# Patient Record
Sex: MALE | Race: WHITE | Employment: OTHER | URBAN - METROPOLITAN AREA
[De-identification: names, ages, dates, MRNs, and addresses within clinical notes are randomized per-mention and may not be internally consistent; named-entity substitution may affect disease eponyms.]

---

## 2017-08-24 ENCOUNTER — RECORDS - HEALTHEAST (OUTPATIENT)
Dept: LAB | Facility: CLINIC | Age: 62
End: 2017-08-24

## 2017-08-24 LAB
CHOLEST SERPL-MCNC: 165 MG/DL
FASTING STATUS PATIENT QL REPORTED: NORMAL
HDLC SERPL-MCNC: 40 MG/DL
LDLC SERPL CALC-MCNC: 108 MG/DL
TRIGL SERPL-MCNC: 85 MG/DL

## 2018-09-10 ENCOUNTER — RECORDS - HEALTHEAST (OUTPATIENT)
Dept: LAB | Facility: CLINIC | Age: 63
End: 2018-09-10

## 2018-09-10 LAB
ANION GAP SERPL CALCULATED.3IONS-SCNC: 8 MMOL/L (ref 5–18)
BUN SERPL-MCNC: 11 MG/DL (ref 8–22)
CALCIUM SERPL-MCNC: 9.1 MG/DL (ref 8.5–10.5)
CHLORIDE BLD-SCNC: 105 MMOL/L (ref 98–107)
CO2 SERPL-SCNC: 26 MMOL/L (ref 22–31)
CREAT SERPL-MCNC: 0.78 MG/DL (ref 0.7–1.3)
GFR SERPL CREATININE-BSD FRML MDRD: >60 ML/MIN/1.73M2
GLUCOSE BLD-MCNC: 105 MG/DL (ref 70–125)
POTASSIUM BLD-SCNC: 3.8 MMOL/L (ref 3.5–5)
SODIUM SERPL-SCNC: 139 MMOL/L (ref 136–145)

## 2019-07-23 ENCOUNTER — RECORDS - HEALTHEAST (OUTPATIENT)
Dept: LAB | Facility: CLINIC | Age: 64
End: 2019-07-23

## 2019-07-23 LAB
ANION GAP SERPL CALCULATED.3IONS-SCNC: 10 MMOL/L (ref 5–18)
BUN SERPL-MCNC: 11 MG/DL (ref 8–22)
CALCIUM SERPL-MCNC: 9.2 MG/DL (ref 8.5–10.5)
CHLORIDE BLD-SCNC: 104 MMOL/L (ref 98–107)
CHOLEST SERPL-MCNC: 161 MG/DL
CO2 SERPL-SCNC: 27 MMOL/L (ref 22–31)
CREAT SERPL-MCNC: 0.95 MG/DL (ref 0.7–1.3)
FASTING STATUS PATIENT QL REPORTED: ABNORMAL
GFR SERPL CREATININE-BSD FRML MDRD: >60 ML/MIN/1.73M2
GLUCOSE BLD-MCNC: 108 MG/DL (ref 70–125)
HDLC SERPL-MCNC: 37 MG/DL
LDLC SERPL CALC-MCNC: 99 MG/DL
POTASSIUM BLD-SCNC: 4 MMOL/L (ref 3.5–5)
SODIUM SERPL-SCNC: 141 MMOL/L (ref 136–145)
TRIGL SERPL-MCNC: 124 MG/DL

## 2020-07-08 ENCOUNTER — RECORDS - HEALTHEAST (OUTPATIENT)
Dept: LAB | Facility: CLINIC | Age: 65
End: 2020-07-08

## 2020-07-08 LAB
ANION GAP SERPL CALCULATED.3IONS-SCNC: 10 MMOL/L (ref 5–18)
BUN SERPL-MCNC: 10 MG/DL (ref 8–22)
CALCIUM SERPL-MCNC: 9.5 MG/DL (ref 8.5–10.5)
CHLORIDE BLD-SCNC: 103 MMOL/L (ref 98–107)
CHOLEST SERPL-MCNC: 151 MG/DL
CO2 SERPL-SCNC: 27 MMOL/L (ref 22–31)
CREAT SERPL-MCNC: 0.86 MG/DL (ref 0.7–1.3)
FASTING STATUS PATIENT QL REPORTED: NORMAL
GFR SERPL CREATININE-BSD FRML MDRD: >60 ML/MIN/1.73M2
GLUCOSE BLD-MCNC: 112 MG/DL (ref 70–125)
HDLC SERPL-MCNC: 44 MG/DL
LDLC SERPL CALC-MCNC: 96 MG/DL
POTASSIUM BLD-SCNC: 3.4 MMOL/L (ref 3.5–5)
PSA SERPL-MCNC: 1.6 NG/ML (ref 0–4.5)
SODIUM SERPL-SCNC: 140 MMOL/L (ref 136–145)
TRIGL SERPL-MCNC: 54 MG/DL

## 2020-08-11 ENCOUNTER — TRANSFERRED RECORDS (OUTPATIENT)
Dept: HEALTH INFORMATION MANAGEMENT | Facility: CLINIC | Age: 65
End: 2020-08-11

## 2020-08-11 ENCOUNTER — RECORDS - HEALTHEAST (OUTPATIENT)
Dept: LAB | Facility: CLINIC | Age: 65
End: 2020-08-11

## 2020-08-11 LAB
ANION GAP SERPL CALCULATED.3IONS-SCNC: 10 MMOL/L (ref 5–18)
BUN SERPL-MCNC: 16 MG/DL (ref 8–22)
CALCIUM SERPL-MCNC: 9.2 MG/DL (ref 8.5–10.5)
CHLORIDE BLD-SCNC: 105 MMOL/L (ref 98–107)
CO2 SERPL-SCNC: 25 MMOL/L (ref 22–31)
CREAT SERPL-MCNC: 0.81 MG/DL (ref 0.7–1.3)
GFR SERPL CREATININE-BSD FRML MDRD: >60 ML/MIN/1.73M2
GLUCOSE BLD-MCNC: 94 MG/DL (ref 70–125)
POTASSIUM BLD-SCNC: 3.7 MMOL/L (ref 3.5–5)
SODIUM SERPL-SCNC: 140 MMOL/L (ref 136–145)

## 2020-08-14 ENCOUNTER — MEDICAL CORRESPONDENCE (OUTPATIENT)
Dept: HEALTH INFORMATION MANAGEMENT | Facility: CLINIC | Age: 65
End: 2020-08-14

## 2020-09-09 NOTE — PROGRESS NOTES
CARDIOLOGY CONSULT    REASON FOR CONSULT: AFIB    PRIMARY CARE PHYSICIAN:  Soraya Lima    HISTORY OF PRESENT ILLNESS:  65-year-old male seen for atrial fibrillation and cardiomyopathy.  He has hypertension, venous insufficiency, sleep apnea with no CPAP use, BPH.    A. fib was diagnosed around 2006, he did not report any symptoms at that time.  He has since been on Toprol and warfarin.  Echocardiogram through [place] [facility] showed EF 40 to 45%, mild LVH, severe biatrial enlargement, 1+ AI.  Nuclear stress in 2007 showed normal perfusion, EF 43%.    He has not seen a cardiologist or had any cardiac testing recently.  At baseline he does some light activity.  Prior to the pandemic, he was doing water aerobics with no chest pain or shortness of breath.  More recently he has been quite sedentary.  Blood pressures not checked at home.  He has chronic lower extremity edema with significant varicose veins, he has had venous ablation in the past.    He has a left knee meniscus tear and is hoping to have laparoscopic surgery at some point.    He plans to go to [place] early October.    PAST MEDICAL HISTORY:  1.  Atrial fibrillation  2.  Hypertension  3.  Venous insufficiency  4.  Sleep apnea  5.  BPH    MEDICATIONS:  Current Outpatient Medications   Medication     cephALEXin (KEFLEX) 500 MG capsule     lisinopril (ZESTRIL) 5 MG tablet     metoprolol tartrate (LOPRESSOR) 25 MG tablet     sildenafil (VIAGRA) 100 MG tablet     terazosin (HYTRIN) 10 MG capsule     triamcinolone (KENALOG) 0.1 % external cream     warfarin ANTICOAGULANT (COUMADIN) 7.5 MG tablet     No current facility-administered medications for this visit.          ALLERGIES:  Allergies not on file    SOCIAL HISTORY:  Non-smoker, occasional alcohol use    FAMILY HISTORY: No premature coronary artery disease      REVIEW OF SYSTEMS:  Constitutional:  No weight loss, fever, chills, weakness or fatigue.  HEENT:  Eyes:  No visual loss, blurred vision,  "double vision or yellow sclerae. No hearing loss, sneezing, congestion, runny nose or sore throat.  Skin:  No rash or itching.  Cardiovascular: per HPI  Respiratory: per HPI  GI:  No anorexia, nausea, vomiting or diarrhea. No abdominal pain or blood.  :  No dysurea, hematuria  Neurologic:  No headache, dizziness, syncope, paralysis, ataxia, numbness or tingling in the extremities. No change in bowel or bladder control.  Musculoskeletal:  No muscle, back pain, joint pain or stiffness.  Hematologic:  No anemia, bleeding or bruising.  Lymphatics:  No enlarged nodes. No history of splenectomy.  Psychiatric:  No history of depression or anxiety.  Endocrine:  No reports of sweating, cold or heat intolerance. No polyuria or polydipsia.  Allergies:  No history of asthma, hives, eczema or rhinitis.    PHYSICAL EXAM:  /78 (Cuff Size: Adult Regular)   Pulse 77   Ht 1.778 m (5' 10\")   Wt 133.3 kg (293 lb 12.8 oz)   SpO2 99%   BMI 42.16 kg/m      Constitutional: awake, alert, no distress  Eyes: PERRL, sclera nonicteric  ENT: trachea midline  Respiratory: Lungs clear  Cardiovascular: Regular rate, totally irregular rhythm, no murmurs, 1-2+ nonpitting edema with significant bilateral varicose veins  GI: nondistended, nontender, bowel sounds present  Lymph/Hematologic: no lymphadenopathy  Skin: dry, no rash  Musculoskeletal: good muscle tone, strength 5/5 in upper and lower extremities  Neurologic: no focal deficits  Neuropsychiatric: appropriate affact    DATA:  Labs:   July 2020: Potassium 3.7, creatinine 0.8    EKG: September 10, 2020: Atrial fibrillation, rate 74    ASSESSMENT:  64-year-old male seen for atrial fibrillation and cardiomyopathy.  He has no new cardiac symptoms.  He likely has had chronic A. fib for many years.  Ejection fraction back in 2006 was 40%.  He has not had any recent cardiac studies.  He has no overt heart failure symptoms or angina.    Echocardiogram will be done.  Coronary calcium score " will also be done, he is asking about assessing for plaque burden in his arteries.  He is already on metoprolol and lisinopril.  He is encouraged to check his blood pressure more at home.    RECOMMENDATIONS:  1.  Chronic atrial fibrillation  -Continue metoprolol and warfarin    2.  History of cardiomyopathy  -Echocardiogram  -Coronary calcium score, low threshold for stress testing if very elevated  -Continue lisinopril and metoprolol, consider uptitrating doses    3.  Hypertension  -Encouraged home blood pressure checks, goal blood pressure 130/80 or less    4.  Preop cardiovascular evaluation  -Patient has no concerning symptoms, but will give further recommendations once echocardiogram is done.    Follow-up in 1 year.  Sooner if any significant test abnormalities.    Toño Michelle MD  Cardiology - [facility]  Pager:  [phone]  Text Page  September 10, 2020    Addendum:   Echocardiogram shows EF 45%, 1-2+ MR, aorta 4.6 cm.  Ejection fraction is unchanged from previous echoes.    Patient is low risk for any cardiac complications for his upcoming knee surgery.  No additional cardiac testing is required prior to surgery.    Toño Michelle MD  Cardiology - [facility]  Pager: [phone]  Text Page  September 30, 2020

## 2020-09-10 ENCOUNTER — OFFICE VISIT (OUTPATIENT)
Dept: CARDIOLOGY | Facility: CLINIC | Age: 65
End: 2020-09-10
Payer: COMMERCIAL

## 2020-09-10 VITALS
HEIGHT: 70 IN | BODY MASS INDEX: 42.06 KG/M2 | OXYGEN SATURATION: 99 % | SYSTOLIC BLOOD PRESSURE: 128 MMHG | WEIGHT: 293.8 LBS | HEART RATE: 77 BPM | DIASTOLIC BLOOD PRESSURE: 78 MMHG

## 2020-09-10 DIAGNOSIS — I42.9 CARDIOMYOPATHY, UNSPECIFIED TYPE (H): ICD-10-CM

## 2020-09-10 DIAGNOSIS — Z13.6 SCREENING FOR CARDIOVASCULAR CONDITION: Primary | ICD-10-CM

## 2020-09-10 DIAGNOSIS — I48.21 PERMANENT ATRIAL FIBRILLATION (H): ICD-10-CM

## 2020-09-10 PROCEDURE — 99204 OFFICE O/P NEW MOD 45 MIN: CPT | Performed by: INTERNAL MEDICINE

## 2020-09-10 PROCEDURE — 93000 ELECTROCARDIOGRAM COMPLETE: CPT | Performed by: INTERNAL MEDICINE

## 2020-09-10 RX ORDER — SILDENAFIL 100 MG/1
TABLET, FILM COATED ORAL
COMMUNITY
Start: 2020-07-08

## 2020-09-10 RX ORDER — WARFARIN SODIUM 7.5 MG/1
TABLET ORAL
COMMUNITY
Start: 2020-03-30

## 2020-09-10 RX ORDER — LISINOPRIL 5 MG/1
TABLET ORAL
COMMUNITY
Start: 2020-07-02 | End: 2021-07-14 | Stop reason: DRUGHIGH

## 2020-09-10 RX ORDER — TERAZOSIN 10 MG/1
CAPSULE ORAL
COMMUNITY
Start: 2020-07-02

## 2020-09-10 RX ORDER — CEPHALEXIN 500 MG/1
CAPSULE ORAL
COMMUNITY
Start: 2020-07-16

## 2020-09-10 RX ORDER — METOPROLOL TARTRATE 25 MG/1
TABLET, FILM COATED ORAL
COMMUNITY
Start: 2020-07-17

## 2020-09-10 RX ORDER — TRIAMCINOLONE ACETONIDE 1 MG/G
CREAM TOPICAL
COMMUNITY
Start: 2020-07-17

## 2020-09-10 SDOH — HEALTH STABILITY: MENTAL HEALTH: HOW OFTEN DO YOU HAVE A DRINK CONTAINING ALCOHOL?: NEVER

## 2020-09-10 ASSESSMENT — MIFFLIN-ST. JEOR: SCORE: 2123.92

## 2020-09-10 NOTE — LETTER
9/10/2020    Soraya Lima  [facility] [address]  [facility] [address]    RE: Kings Priest       Dear Colleague,    I had the pleasure of seeing Kings Priest in the [facility].    CARDIOLOGY CONSULT    REASON FOR CONSULT: AFIB    PRIMARY CARE PHYSICIAN:  Soraya Lima    HISTORY OF PRESENT ILLNESS:  65-year-old male seen for atrial fibrillation and cardiomyopathy.  He has hypertension, venous insufficiency, sleep apnea with no CPAP use, BPH.    A. fib was diagnosed around 2006, he did not report any symptoms at that time.  He has since been on Toprol and warfarin.  Echocardiogram through [place] [facility] showed EF 40 to 45%, mild LVH, severe biatrial enlargement, 1+ AI.  Nuclear stress in 2007 showed normal perfusion, EF 43%.    He has not seen a cardiologist or had any cardiac testing recently.  At baseline he does some light activity.  Prior to the pandemic, he was doing water aerobics with no chest pain or shortness of breath.  More recently he has been quite sedentary.  Blood pressures not checked at home.  He has chronic lower extremity edema with significant varicose veins, he has had venous ablation in the past.    He has a left knee meniscus tear and is hoping to have laparoscopic surgery at some point.    He plans to go to [place] early October.    PAST MEDICAL HISTORY:  1.  Atrial fibrillation  2.  Hypertension  3.  Venous insufficiency  4.  Sleep apnea  5.  BPH    MEDICATIONS:  Current Outpatient Medications   Medication     cephALEXin (KEFLEX) 500 MG capsule     lisinopril (ZESTRIL) 5 MG tablet     metoprolol tartrate (LOPRESSOR) 25 MG tablet     sildenafil (VIAGRA) 100 MG tablet     terazosin (HYTRIN) 10 MG capsule     triamcinolone (KENALOG) 0.1 % external cream     warfarin ANTICOAGULANT (COUMADIN) 7.5 MG tablet     No current facility-administered medications for this visit.          ALLERGIES:  Allergies not on file    SOCIAL  "HISTORY:  Non-smoker, occasional alcohol use    FAMILY HISTORY: No premature coronary artery disease      REVIEW OF SYSTEMS:  Constitutional:  No weight loss, fever, chills, weakness or fatigue.  HEENT:  Eyes:  No visual loss, blurred vision, double vision or yellow sclerae. No hearing loss, sneezing, congestion, runny nose or sore throat.  Skin:  No rash or itching.  Cardiovascular: per HPI  Respiratory: per HPI  GI:  No anorexia, nausea, vomiting or diarrhea. No abdominal pain or blood.  :  No dysurea, hematuria  Neurologic:  No headache, dizziness, syncope, paralysis, ataxia, numbness or tingling in the extremities. No change in bowel or bladder control.  Musculoskeletal:  No muscle, back pain, joint pain or stiffness.  Hematologic:  No anemia, bleeding or bruising.  Lymphatics:  No enlarged nodes. No history of splenectomy.  Psychiatric:  No history of depression or anxiety.  Endocrine:  No reports of sweating, cold or heat intolerance. No polyuria or polydipsia.  Allergies:  No history of asthma, hives, eczema or rhinitis.    PHYSICAL EXAM:  /78 (Cuff Size: Adult Regular)   Pulse 77   Ht 1.778 m (5' 10\")   Wt 133.3 kg (293 lb 12.8 oz)   SpO2 99%   BMI 42.16 kg/m      Constitutional: awake, alert, no distress  Eyes: PERRL, sclera nonicteric  ENT: trachea midline  Respiratory: Lungs clear  Cardiovascular: Regular rate, totally irregular rhythm, no murmurs, 1-2+ nonpitting edema with significant bilateral varicose veins  GI: nondistended, nontender, bowel sounds present  Lymph/Hematologic: no lymphadenopathy  Skin: dry, no rash  Musculoskeletal: good muscle tone, strength 5/5 in upper and lower extremities  Neurologic: no focal deficits  Neuropsychiatric: appropriate affact    DATA:  Labs:   July 2020: Potassium 3.7, creatinine 0.8    EKG: September 10, 2020: Atrial fibrillation, rate 74    ASSESSMENT:  64-year-old male seen for atrial fibrillation and cardiomyopathy.  He has no new cardiac symptoms.  " He likely has had chronic A. fib for many years.  Ejection fraction back in 2006 was 40%.  He has not had any recent cardiac studies.  He has no overt heart failure symptoms or angina.    Echocardiogram will be done.  Coronary calcium score will also be done, he is asking about assessing for plaque burden in his arteries.  He is already on metoprolol and lisinopril.  He is encouraged to check his blood pressure more at home.    RECOMMENDATIONS:  1.  Chronic atrial fibrillation  -Continue metoprolol and warfarin    2.  History of cardiomyopathy  -Echocardiogram  -Coronary calcium score, low threshold for stress testing if very elevated  -Continue lisinopril and metoprolol, consider uptitrating doses    3.  Hypertension  -Encouraged home blood pressure checks, goal blood pressure 130/80 or less    4.  Preop cardiovascular evaluation  -Patient has no concerning symptoms, but will give further recommendations once echocardiogram is done.    Follow-up in 1 year.  Sooner if any significant test abnormalities.    Toño Michelle MD  Cardiology - [facility]  Pager:  [phone]  Text Page  September 10, 2020        Thank you for allowing me to participate in the care of your patient.    Sincerely,     Toño Michelle MD     [facility]

## 2020-09-10 NOTE — PATIENT INSTRUCTIONS
"Check blood pressure at home once weekly.  Goal BP is 130/80 or less.    Echocardiogram  Will schedule an echocardiogram, or ultrasound of the heart.  This looks at the size and function of the heart and valves.  It generally takes about 20-30 minutes.  This will tell if there is any reduced heart function or problem with any of the valves.    Getting Ready for a CT Coronary Calcium Scan  What is this test?  A calcium scoring CT (computed tomography) scan is a painless, highly sensitive test that shows the amount of calcium build-up in your heart arteries. This tells us your future risk for heart attack.  Any plaque that has started to form in the heart arteries will show up on the CT.  A \"calcium score\" is then calculated and is compared to others of your age and gender.  This test does not show the percent blockage in any given artery, but rather a \"global burden\" of plaque.  If the calcium score is very high, then other testing such as stress testing is sometimes recommended.  Will my insurance cover it?  Please check with your insurance plan. This is a screening test, which may or may not be covered by insurance.   How do I get ready for my exam?  It is best to avoid caffeine on the day of your test.   The entire exam will take about 30 minutes or less.   Be sure to tell your doctor:    If there s any chance you are pregnant.     If you have any special needs.  What happens during the exam?  Please wear loose clothing, such as a sweat suit or jogging clothes. Avoid snaps, zippers and other metal. We may ask you to undress and put on a hospital gown.     You will lie on a table that will move through the scanner. The scanner is a short tube.    You will not be enclosed. The scan takes only a few minutes.    You must lie very still during the scans and follow breathing instructions.  Is it safe?  As with any scan that uses radiation, there is a very small increased risk of cancer. Your doctor orders a scan when he " or she feels the potential benefits outweigh the risks of the scan. Please talk with your doctor if you have any concerns before your exam.  When will I know the results?  You should discuss your test with your family doctor. He or she can go over the results with you. If needed, he or she can also suggest treatment or lifestyle changes.

## 2020-09-10 NOTE — LETTER
9/10/2020    Soraya Lima  [facility] [address]  [facility] [address]    RE: Kings Priest       Dear Colleague,    I had the pleasure of seeing Kings Priest in the [facility].    CARDIOLOGY CONSULT    REASON FOR CONSULT: AFIB    PRIMARY CARE PHYSICIAN:  Soraya Lima    HISTORY OF PRESENT ILLNESS:  65-year-old male seen for atrial fibrillation and cardiomyopathy.  He has hypertension, venous insufficiency, sleep apnea with no CPAP use, BPH.    A. fib was diagnosed around 2006, he did not report any symptoms at that time.  He has since been on Toprol and warfarin.  Echocardiogram through [place] [facility] showed EF 40 to 45%, mild LVH, severe biatrial enlargement, 1+ AI.  Nuclear stress in 2007 showed normal perfusion, EF 43%.    He has not seen a cardiologist or had any cardiac testing recently.  At baseline he does some light activity.  Prior to the pandemic, he was doing water aerobics with no chest pain or shortness of breath.  More recently he has been quite sedentary.  Blood pressures not checked at home.  He has chronic lower extremity edema with significant varicose veins, he has had venous ablation in the past.    He has a left knee meniscus tear and is hoping to have laparoscopic surgery at some point.    He plans to go to [place] early October.    PAST MEDICAL HISTORY:  1.  Atrial fibrillation  2.  Hypertension  3.  Venous insufficiency  4.  Sleep apnea  5.  BPH    MEDICATIONS:  Current Outpatient Medications   Medication     cephALEXin (KEFLEX) 500 MG capsule     lisinopril (ZESTRIL) 5 MG tablet     metoprolol tartrate (LOPRESSOR) 25 MG tablet     sildenafil (VIAGRA) 100 MG tablet     terazosin (HYTRIN) 10 MG capsule     triamcinolone (KENALOG) 0.1 % external cream     warfarin ANTICOAGULANT (COUMADIN) 7.5 MG tablet     No current facility-administered medications for this visit.          ALLERGIES:  Allergies not on file    SOCIAL  "HISTORY:  Non-smoker, occasional alcohol use    FAMILY HISTORY: No premature coronary artery disease      REVIEW OF SYSTEMS:  Constitutional:  No weight loss, fever, chills, weakness or fatigue.  HEENT:  Eyes:  No visual loss, blurred vision, double vision or yellow sclerae. No hearing loss, sneezing, congestion, runny nose or sore throat.  Skin:  No rash or itching.  Cardiovascular: per HPI  Respiratory: per HPI  GI:  No anorexia, nausea, vomiting or diarrhea. No abdominal pain or blood.  :  No dysurea, hematuria  Neurologic:  No headache, dizziness, syncope, paralysis, ataxia, numbness or tingling in the extremities. No change in bowel or bladder control.  Musculoskeletal:  No muscle, back pain, joint pain or stiffness.  Hematologic:  No anemia, bleeding or bruising.  Lymphatics:  No enlarged nodes. No history of splenectomy.  Psychiatric:  No history of depression or anxiety.  Endocrine:  No reports of sweating, cold or heat intolerance. No polyuria or polydipsia.  Allergies:  No history of asthma, hives, eczema or rhinitis.    PHYSICAL EXAM:  /78 (Cuff Size: Adult Regular)   Pulse 77   Ht 1.778 m (5' 10\")   Wt 133.3 kg (293 lb 12.8 oz)   SpO2 99%   BMI 42.16 kg/m      Constitutional: awake, alert, no distress  Eyes: PERRL, sclera nonicteric  ENT: trachea midline  Respiratory: Lungs clear  Cardiovascular: Regular rate, totally irregular rhythm, no murmurs, 1-2+ nonpitting edema with significant bilateral varicose veins  GI: nondistended, nontender, bowel sounds present  Lymph/Hematologic: no lymphadenopathy  Skin: dry, no rash  Musculoskeletal: good muscle tone, strength 5/5 in upper and lower extremities  Neurologic: no focal deficits  Neuropsychiatric: appropriate affact    DATA:  Labs:   July 2020: Potassium 3.7, creatinine 0.8    EKG: September 10, 2020: Atrial fibrillation, rate 74    ASSESSMENT:  64-year-old male seen for atrial fibrillation and cardiomyopathy.  He has no new cardiac symptoms.  " He likely has had chronic A. fib for many years.  Ejection fraction back in 2006 was 40%.  He has not had any recent cardiac studies.  He has no overt heart failure symptoms or angina.    Echocardiogram will be done.  Coronary calcium score will also be done, he is asking about assessing for plaque burden in his arteries.  He is already on metoprolol and lisinopril.  He is encouraged to check his blood pressure more at home.    RECOMMENDATIONS:  1.  Chronic atrial fibrillation  -Continue metoprolol and warfarin    2.  History of cardiomyopathy  -Echocardiogram  -Coronary calcium score, low threshold for stress testing if very elevated  -Continue lisinopril and metoprolol, consider uptitrating doses    3.  Hypertension  -Encouraged home blood pressure checks, goal blood pressure 130/80 or less    4.  Preop cardiovascular evaluation  -Patient has no concerning symptoms, but will give further recommendations once echocardiogram is done.    Follow-up in 1 year.  Sooner if any significant test abnormalities.    Toño Michelle MD  Cardiology - [facility]  Pager:  [phone]  Text Page  September 10, 2020        Thank you for allowing me to participate in the care of your patient.      Sincerely,     Toño Michelle MD     [facility]    cc:   No referring provider defined for this encounter.

## 2020-09-29 ENCOUNTER — HOSPITAL ENCOUNTER (OUTPATIENT)
Dept: CARDIOLOGY | Facility: CLINIC | Age: 65
End: 2020-09-29
Attending: INTERNAL MEDICINE
Payer: COMMERCIAL

## 2020-09-29 DIAGNOSIS — I48.21 PERMANENT ATRIAL FIBRILLATION (H): ICD-10-CM

## 2020-09-29 PROCEDURE — 93306 TTE W/DOPPLER COMPLETE: CPT | Mod: 26 | Performed by: INTERNAL MEDICINE

## 2020-09-29 PROCEDURE — 93306 TTE W/DOPPLER COMPLETE: CPT

## 2020-09-30 ENCOUNTER — DOCUMENTATION ONLY (OUTPATIENT)
Dept: CARDIOLOGY | Facility: CLINIC | Age: 65
End: 2020-09-30

## 2020-09-30 NOTE — PROGRESS NOTES
Faxed Dr. Michelle' last OV note (addended) to [facility] (fax [phone]).   Sophia RN, BSN  09/30/20 2:09 PM

## 2020-10-01 ENCOUNTER — TELEPHONE (OUTPATIENT)
Dept: CARDIOLOGY | Facility: CLINIC | Age: 65
End: 2020-10-01

## 2020-10-01 NOTE — TELEPHONE ENCOUNTER
----- Message from Toño Michelle MD sent at 9/30/2020  9:46 AM CDT -----  Echo is unchanged from previous.  He is low risk for knee surgery, I addended my note.      Neeraj      Called pt, advised echo results. Mailed copy of Echo report to him per pt request.   Already faxed Dr. Michelle' last OV note to [facility]. Pt will still have CT Calcium screening 10/5/20.   Sophia RN, BSN  10/01/20 1:06 PM

## 2020-10-05 ENCOUNTER — HOSPITAL ENCOUNTER (OUTPATIENT)
Dept: CARDIOLOGY | Facility: CLINIC | Age: 65
Discharge: HOME | End: 2020-10-05
Attending: INTERNAL MEDICINE | Admitting: INTERNAL MEDICINE
Payer: COMMERCIAL

## 2020-10-05 ENCOUNTER — TELEPHONE (OUTPATIENT)
Dept: CARDIOLOGY | Facility: CLINIC | Age: 65
End: 2020-10-05

## 2020-10-05 DIAGNOSIS — R93.1 ELEVATED CORONARY ARTERY CALCIUM SCORE: ICD-10-CM

## 2020-10-05 DIAGNOSIS — I42.9 CARDIOMYOPATHY (H): ICD-10-CM

## 2020-10-05 DIAGNOSIS — Z13.6 SCREENING FOR CARDIOVASCULAR CONDITION: Primary | ICD-10-CM

## 2020-10-05 DIAGNOSIS — I48.21 PERMANENT ATRIAL FIBRILLATION (H): ICD-10-CM

## 2020-10-05 DIAGNOSIS — R91.8 PULMONARY NODULES: ICD-10-CM

## 2020-10-05 PROCEDURE — 75571 CT HRT W/O DYE W/CA TEST: CPT | Mod: 26 | Performed by: INTERNAL MEDICINE

## 2020-10-05 PROCEDURE — 75571 CT HRT W/O DYE W/CA TEST: CPT | Mod: GA

## 2020-10-05 NOTE — TELEPHONE ENCOUNTER
Last visit 9/10/20 w/ Dr. Michelle for Chronic AFib, CM and preop evaluation. No new sxs. EF in 2006 was 40%. On lisinopril, metoprolol and warfarin.  Ordered Echo and Coronary calcium score - noted low threshold for stress testing if very elevated. Preop recommendations based on test results. Rec follow-up x1 yr.   Echo done 9/29/20. Low risk for knee surgery per Dr. Michelle    ----- Message from Toño Michelle MD sent at 10/5/2020 12:32 PM CDT -----  Calcium score is moderately elevated, a little more than average for his age.  I would like him to have a Lexiscan nuclear stress.  Then follow-up with MABEL.  Also can you put an order in for noncontrast chest CT in 6 months to follow-up on lung nodules.  Thanks,  Neeraj      Called pt, reviewed results. Pt understood. He is currently in [place], [place] for the next 6 weeks. Wants to schedule it in mid-late November. I advised if he experiences any new symptoms to see a provider in [place], can give me a call. We cannot do visits with the patient while he is in [place].   Will send copy of CT results to his PCP at [facility].   Pt requested we mail him a copy of his results (address for the next 6 weeks: [address], [address], [place] [address]).  I sent him a message in [employer] as well. Will have scheduling call him.   Sophia RAYMOND, BSN  10/05/20 2:51 PM

## 2020-11-16 ENCOUNTER — TELEPHONE (OUTPATIENT)
Dept: CARDIOLOGY | Facility: CLINIC | Age: 65
End: 2020-11-16

## 2020-11-16 NOTE — TELEPHONE ENCOUNTER
Pt called, is scheduled for lexiscan 11/25/20 however he is still in [place]. Is worried about coming back to [place] when the covid cases are so high. Wonders how urgent his stress test is. Reviewed telephone encounter 10/5/20. Advised he could certainly establish a cardiologist in [place] and they can order a stress test there. He will think about it and get back to me.   Sophia RN, BSN  11/16/20 11:52 AM

## 2020-12-21 ENCOUNTER — HOSPITAL ENCOUNTER (OUTPATIENT)
Dept: NUCLEAR MEDICINE | Facility: CLINIC | Age: 65
Setting detail: NUCLEAR MEDICINE
Discharge: HOME | End: 2020-12-21
Attending: INTERNAL MEDICINE | Admitting: INTERNAL MEDICINE
Payer: COMMERCIAL

## 2020-12-21 ENCOUNTER — HOSPITAL ENCOUNTER (OUTPATIENT)
Dept: CARDIOLOGY | Facility: CLINIC | Age: 65
End: 2020-12-21
Attending: INTERNAL MEDICINE
Payer: COMMERCIAL

## 2020-12-21 DIAGNOSIS — R93.1 ELEVATED CORONARY ARTERY CALCIUM SCORE: ICD-10-CM

## 2020-12-21 DIAGNOSIS — I42.9 CARDIOMYOPATHY (H): ICD-10-CM

## 2020-12-21 DIAGNOSIS — Z13.6 SCREENING FOR CARDIOVASCULAR CONDITION: ICD-10-CM

## 2020-12-21 DIAGNOSIS — I48.21 PERMANENT ATRIAL FIBRILLATION (H): ICD-10-CM

## 2020-12-21 PROCEDURE — 250N000011 HC RX IP 250 OP 636: Performed by: INTERNAL MEDICINE

## 2020-12-21 PROCEDURE — 78452 HT MUSCLE IMAGE SPECT MULT: CPT | Mod: 26 | Performed by: INTERNAL MEDICINE

## 2020-12-21 PROCEDURE — A9502 TC99M TETROFOSMIN: HCPCS | Performed by: INTERNAL MEDICINE

## 2020-12-21 PROCEDURE — 78452 HT MUSCLE IMAGE SPECT MULT: CPT

## 2020-12-21 PROCEDURE — 93016 CV STRESS TEST SUPVJ ONLY: CPT | Performed by: INTERNAL MEDICINE

## 2020-12-21 PROCEDURE — 343N000001 HC RX 343: Performed by: INTERNAL MEDICINE

## 2020-12-21 PROCEDURE — 93018 CV STRESS TEST I&R ONLY: CPT | Performed by: INTERNAL MEDICINE

## 2020-12-21 PROCEDURE — 93017 CV STRESS TEST TRACING ONLY: CPT

## 2020-12-21 RX ORDER — REGADENOSON 0.08 MG/ML
0.4 INJECTION, SOLUTION INTRAVENOUS ONCE
Status: COMPLETED | OUTPATIENT
Start: 2020-12-21 | End: 2020-12-21

## 2020-12-21 RX ORDER — ALBUTEROL SULFATE 90 UG/1
2 AEROSOL, METERED RESPIRATORY (INHALATION) EVERY 5 MIN PRN
Status: DISCONTINUED | OUTPATIENT
Start: 2020-12-21 | End: 2020-12-22 | Stop reason: HOSPADM

## 2020-12-21 RX ORDER — AMINOPHYLLINE 25 MG/ML
50-100 INJECTION, SOLUTION INTRAVENOUS
Status: DISCONTINUED | OUTPATIENT
Start: 2020-12-21 | End: 2020-12-22 | Stop reason: HOSPADM

## 2020-12-21 RX ORDER — CAFFEINE CITRATE 20 MG/ML
60 SOLUTION INTRAVENOUS
Status: DISCONTINUED | OUTPATIENT
Start: 2020-12-21 | End: 2020-12-22 | Stop reason: HOSPADM

## 2020-12-21 RX ORDER — REGADENOSON 0.08 MG/ML
INJECTION, SOLUTION INTRAVENOUS
Status: DISCONTINUED
Start: 2020-12-21 | End: 2020-12-21 | Stop reason: HOSPADM

## 2020-12-21 RX ORDER — ACYCLOVIR 200 MG/1
0-1 CAPSULE ORAL
Status: DISCONTINUED | OUTPATIENT
Start: 2020-12-21 | End: 2020-12-22 | Stop reason: HOSPADM

## 2020-12-21 RX ADMIN — REGADENOSON 0.4 MG: 0.08 INJECTION, SOLUTION INTRAVENOUS at 08:00

## 2020-12-21 RX ADMIN — Medication 34 MILLICURIE: at 08:06

## 2020-12-22 ENCOUNTER — HOSPITAL ENCOUNTER (OUTPATIENT)
Dept: NUCLEAR MEDICINE | Facility: CLINIC | Age: 65
Setting detail: NUCLEAR MEDICINE
End: 2020-12-22
Attending: INTERNAL MEDICINE
Payer: COMMERCIAL

## 2020-12-22 LAB
CV STRESS MAX HR HE: 75
NUC STRESS EJECTION FRACTION: 57 %
RATE PRESSURE PRODUCT: NORMAL
STRESS ECHO BASELINE DIASTOLIC HE: 88
STRESS ECHO BASELINE HR: 76
STRESS ECHO BASELINE SYSTOLIC BP: 139
STRESS ECHO CALCULATED PERCENT HR: 49 %
STRESS ECHO LAST STRESS DIASTOLIC BP: 89
STRESS ECHO LAST STRESS SYSTOLIC BP: 138
STRESS ECHO TARGET HR: 154

## 2020-12-22 PROCEDURE — A9502 TC99M TETROFOSMIN: HCPCS | Performed by: INTERNAL MEDICINE

## 2020-12-22 PROCEDURE — 343N000001 HC RX 343: Performed by: INTERNAL MEDICINE

## 2020-12-22 RX ADMIN — Medication 34 MILLICURIE: at 11:53

## 2020-12-23 ENCOUNTER — TELEPHONE (OUTPATIENT)
Dept: CARDIOLOGY | Facility: CLINIC | Age: 65
End: 2020-12-23

## 2020-12-23 NOTE — TELEPHONE ENCOUNTER
Last visit 9/10/20 w/ Dr. Michelle for Chronic AFib, CM and preop evaluation. No new sxs. EF in 2006 was 40%. Ordered Echo and Coronary calcium score.  Echo done 9/29/20. Low risk for knee surgery per Dr. Michelle.   Calcium score 10/5/20, moderately elevated, a little more than average for his age. Dr. Michelle recommended Lexiscan nuclear stress test then follow-up with MABEL. Also ordered noncontrast CT in 6 months to follow-up on lung nodules.   Pt didn't have any testing done right away because he was in [place]. Does not have a follow-up MABEL visit scheduled yet. There are limited options for MABEL follow-up before pt leaves for [place] 12/29/20 in the afternoon.     Pt had Lexiscan 12/22/20, reviewed by Dr. Michelle    ----- Message from Toño Michelle MD sent at 12/23/2020 10:39 AM CST -----  Stress is equivocal.  If he is not having any chest pain or similar symptoms, then f/u when he returns from [place].  If any symptoms, then f/u sooner with MABEL.  Neeraj    Called pt, reviewed results/recommendations. Pt thinks he will be back in [place] in June 2021. Updated orders.  Sophia RN, BSN  12/23/20 2:41 PM

## 2021-01-04 ENCOUNTER — HEALTH MAINTENANCE LETTER (OUTPATIENT)
Age: 66
End: 2021-01-04

## 2021-05-30 ENCOUNTER — RECORDS - HEALTHEAST (OUTPATIENT)
Dept: ADMINISTRATIVE | Facility: CLINIC | Age: 66
End: 2021-05-30

## 2021-06-02 ENCOUNTER — RECORDS - HEALTHEAST (OUTPATIENT)
Dept: ADMINISTRATIVE | Facility: CLINIC | Age: 66
End: 2021-06-02

## 2021-06-28 ENCOUNTER — HOSPITAL ENCOUNTER (OUTPATIENT)
Dept: CT IMAGING | Facility: CLINIC | Age: 66
Discharge: HOME | End: 2021-06-28
Attending: INTERNAL MEDICINE | Admitting: INTERNAL MEDICINE
Payer: COMMERCIAL

## 2021-06-28 DIAGNOSIS — R91.8 PULMONARY NODULES: ICD-10-CM

## 2021-06-28 PROCEDURE — 71250 CT THORAX DX C-: CPT

## 2021-07-10 NOTE — PROGRESS NOTES
CARDIOLOGY VISIT    REASON FOR VISIT: MARQUES fib, cardiomyopathy, dilated aorta, CAD    SUBJECTIVE:  65-year-old male seen for atrial fibrillation, cardiomyopathy, CAD, and dilated aorta.  He has hypertension, venous insufficiency, sleep apnea with no CPAP use, BPH.     MARQUES ewing was diagnosed around 2006, he did not report any symptoms at that time.  He has since been on Toprol and warfarin.      Echocardiogram through [place] [facility] showed EF 40 to 45%, mild LVH, severe biatrial enlargement, 1+ AI.  Nuclear stress in 2007 showed normal perfusion, EF 43%.    Echo September 2020 showed EF 40%, moderate LVH, severe left atrial enlargement, 1-2+ MR, aorta 4.6 cm.    Coronary calcium score October 2020 478, 68th percentile, aorta 4.3cm.  Nuclear stress showed mild ischemia of the inferior, apical, and inferoseptal segments.    Chest CT June 2021 showed ascending aorta 4.6 cm.    He has been feeling about the same recently.  Due to his pool needing maintenance, he has been doing less water aerobics.  However he has been doing a Yee and tabata classes.  He denies any exertional chest pain or palpitations.  Average heart rate is 73 based on his new apple watch.  He has some mild lower extremity edema with his significant varicose veins, he thinks this is unchanged.    MEDICATIONS:  Current Outpatient Medications   Medication     cephALEXin (KEFLEX) 500 MG capsule     lisinopril (ZESTRIL) 5 MG tablet     metoprolol tartrate (LOPRESSOR) 25 MG tablet     sildenafil (VIAGRA) 100 MG tablet     terazosin (HYTRIN) 10 MG capsule     triamcinolone (KENALOG) 0.1 % external cream     warfarin ANTICOAGULANT (COUMADIN) 7.5 MG tablet     No current facility-administered medications for this visit.        ALLERGIES:  No Known Allergies    REVIEW OF SYSTEMS:  Constitutional:  No weight loss, fever, chills, weakness or fatigue.  HEENT:  Eyes:  No visual loss, blurred vision, double vision or yellow sclerae. No hearing loss, sneezing,  "congestion, runny nose or sore throat.  Skin:  No rash or itching.  Cardiovascular: per HPI  Respiratory: per HPI  GI:  No anorexia, nausea, vomiting or diarrhea. No abdominal pain or blood.  :  No dysurea, hematuria  Neurologic:  No headache, dizziness, syncope, paralysis, ataxia, numbness or tingling in the extremities. No change in bowel or bladder control.  Musculoskeletal:  No muscle, back pain, joint pain or stiffness.  Hematologic:  No anemia, bleeding or bruising.  Lymphatics:  No enlarged nodes. No history of splenectomy.  Psychiatric:  No history of depression or anxiety.  Endocrine:  No reports of sweating, cold or heat intolerance. No polyuria or polydipsia.  Allergies:  No history of asthma, hives, eczema or rhinitis.    PHYSICAL EXAM:  BP (!) 146/98 (BP Location: Right arm, Patient Position: Chair, Cuff Size: Adult Large)   Pulse 72   Ht 1.778 m (5' 10\")   Wt 135.3 kg (298 lb 4.8 oz)   SpO2 97%   BMI 42.80 kg/m      Constitutional: awake, alert, no distress  Eyes: PERRL, sclera nonicteric  ENT: trachea midline  Respiratory: Lungs clear  Cardiovascular: Regular rate, totally irregular rhythm  GI: nondistended, nontender, bowel sounds present  Lymph/Hematologic: no lymphadenopathy  Skin: dry, no rash  Musculoskeletal: good muscle tone, strength 5/5 in upper and lower extremities  Neurologic: no focal deficits  Neuropsychiatric: appropriate affact    DATA:  Lab: July 2020: Cholesterol 151, LDL 96    ASSESSMENT:  65-year-old male seen for A. fib, cardiomyopathy, coronary disease, and dilated aorta.  His cardiac issues are stable.  He almost certainly in persistent A. fib with controlled heart rate.  Aorta is stable at 4.6cm compared to echo from last year.  Blood pressure is a little high in clinic.  He was encouraged to purchase a monitor at home.    RECOMMENDATIONS:  1.  Permanent atrial fibrillation  -Continue rate control and warfarin    2.  CAD  -Continue medical therapy, check lipids and " follow-up    3.  Cardiomyopathy, suspect nonischemic  -Repeat echo next year    4.  Dilated ascending aorta  -Repeat echo in 1 year    5.  Hypertension, suspect mildly uncontrolled  -Increase lisinopril to 10 mg daily  -Encouraged purchasing a home monitor, goal blood pressure 130/80 or less, call the clinic if still running high    Follow-up in 1 year with echo.    Toño Michelle MD  Cardiology - [facility]  Pager:  [phone]  Text Page  July 14, 2021

## 2021-07-14 ENCOUNTER — OFFICE VISIT (OUTPATIENT)
Dept: CARDIOLOGY | Facility: CLINIC | Age: 66
End: 2021-07-14
Payer: COMMERCIAL

## 2021-07-14 VITALS
WEIGHT: 298.3 LBS | SYSTOLIC BLOOD PRESSURE: 146 MMHG | HEART RATE: 72 BPM | HEIGHT: 70 IN | OXYGEN SATURATION: 97 % | BODY MASS INDEX: 42.71 KG/M2 | DIASTOLIC BLOOD PRESSURE: 98 MMHG

## 2021-07-14 DIAGNOSIS — I48.21 PERMANENT ATRIAL FIBRILLATION (H): ICD-10-CM

## 2021-07-14 DIAGNOSIS — E66.01 MORBID OBESITY (H): ICD-10-CM

## 2021-07-14 DIAGNOSIS — I42.9 CARDIOMYOPATHY, UNSPECIFIED TYPE (H): ICD-10-CM

## 2021-07-14 DIAGNOSIS — I10 BENIGN ESSENTIAL HYPERTENSION: ICD-10-CM

## 2021-07-14 DIAGNOSIS — I25.10 CORONARY ARTERY DISEASE INVOLVING NATIVE CORONARY ARTERY OF NATIVE HEART WITHOUT ANGINA PECTORIS: Primary | ICD-10-CM

## 2021-07-14 PROCEDURE — 99214 OFFICE O/P EST MOD 30 MIN: CPT | Performed by: INTERNAL MEDICINE

## 2021-07-14 RX ORDER — HYDROCHLOROTHIAZIDE 25 MG/1
TABLET ORAL
COMMUNITY
Start: 2021-04-20

## 2021-07-14 RX ORDER — LISINOPRIL 10 MG/1
10 TABLET ORAL DAILY
Qty: 90 TABLET | Refills: 3 | Status: SHIPPED | OUTPATIENT
Start: 2021-07-14 | End: 2022-08-01

## 2021-07-14 RX ORDER — POTASSIUM CHLORIDE 1500 MG/1
20 TABLET, EXTENDED RELEASE ORAL 2 TIMES DAILY
COMMUNITY
Start: 2021-05-04

## 2021-07-14 ASSESSMENT — MIFFLIN-ST. JEOR: SCORE: 2139.33

## 2021-07-14 NOTE — LETTER
7/14/2021    Soraya Lima  [facility] [address]  [facility] [address]    RE: Kings Priest       Dear Colleague,    I had the pleasure of seeing Kings Priest in the [facility].    CARDIOLOGY VISIT    REASON FOR VISIT: A. fib, cardiomyopathy, dilated aorta, CAD    SUBJECTIVE:  65-year-old male seen for atrial fibrillation, cardiomyopathy, CAD, and dilated aorta.  He has hypertension, venous insufficiency, sleep apnea with no CPAP use, BPH.     MARQUES ewing was diagnosed around 2006, he did not report any symptoms at that time.  He has since been on Toprol and warfarin.      Echocardiogram through [place] [facility] showed EF 40 to 45%, mild LVH, severe biatrial enlargement, 1+ AI.  Nuclear stress in 2007 showed normal perfusion, EF 43%.    Echo September 2020 showed EF 40%, moderate LVH, severe left atrial enlargement, 1-2+ MR, aorta 4.6 cm.    Coronary calcium score October 2020 478, 68th percentile, aorta 4.3cm.  Nuclear stress showed mild ischemia of the inferior, apical, and inferoseptal segments.    Chest CT June 2021 showed ascending aorta 4.6 cm.    He has been feeling about the same recently.  Due to his pool needing maintenance, he has been doing less water aerobics.  However he has been doing a Yee and tabata classes.  He denies any exertional chest pain or palpitations.  Average heart rate is 73 based on his new apple watch.  He has some mild lower extremity edema with his significant varicose veins, he thinks this is unchanged.    MEDICATIONS:  Current Outpatient Medications   Medication     cephALEXin (KEFLEX) 500 MG capsule     lisinopril (ZESTRIL) 5 MG tablet     metoprolol tartrate (LOPRESSOR) 25 MG tablet     sildenafil (VIAGRA) 100 MG tablet     terazosin (HYTRIN) 10 MG capsule     triamcinolone (KENALOG) 0.1 % external cream     warfarin ANTICOAGULANT (COUMADIN) 7.5 MG tablet     No current facility-administered  "medications for this visit.        ALLERGIES:  No Known Allergies    REVIEW OF SYSTEMS:  Constitutional:  No weight loss, fever, chills, weakness or fatigue.  HEENT:  Eyes:  No visual loss, blurred vision, double vision or yellow sclerae. No hearing loss, sneezing, congestion, runny nose or sore throat.  Skin:  No rash or itching.  Cardiovascular: per HPI  Respiratory: per HPI  GI:  No anorexia, nausea, vomiting or diarrhea. No abdominal pain or blood.  :  No dysurea, hematuria  Neurologic:  No headache, dizziness, syncope, paralysis, ataxia, numbness or tingling in the extremities. No change in bowel or bladder control.  Musculoskeletal:  No muscle, back pain, joint pain or stiffness.  Hematologic:  No anemia, bleeding or bruising.  Lymphatics:  No enlarged nodes. No history of splenectomy.  Psychiatric:  No history of depression or anxiety.  Endocrine:  No reports of sweating, cold or heat intolerance. No polyuria or polydipsia.  Allergies:  No history of asthma, hives, eczema or rhinitis.    PHYSICAL EXAM:  BP (!) 146/98 (BP Location: Right arm, Patient Position: Chair, Cuff Size: Adult Large)   Pulse 72   Ht 1.778 m (5' 10\")   Wt 135.3 kg (298 lb 4.8 oz)   SpO2 97%   BMI 42.80 kg/m      Constitutional: awake, alert, no distress  Eyes: PERRL, sclera nonicteric  ENT: trachea midline  Respiratory: Lungs clear  Cardiovascular: Regular rate, totally irregular rhythm  GI: nondistended, nontender, bowel sounds present  Lymph/Hematologic: no lymphadenopathy  Skin: dry, no rash  Musculoskeletal: good muscle tone, strength 5/5 in upper and lower extremities  Neurologic: no focal deficits  Neuropsychiatric: appropriate affact    DATA:  Lab: July 2020: Cholesterol 151, LDL 96    ASSESSMENT:  65-year-old male seen for A. fib, cardiomyopathy, coronary disease, and dilated aorta.  His cardiac issues are stable.  He almost certainly in persistent A. fib with controlled heart rate.  Aorta is stable at 4.6cm compared to " echo from last year.  Blood pressure is a little high in clinic.  He was encouraged to purchase a monitor at home.    RECOMMENDATIONS:  1.  Permanent atrial fibrillation  -Continue rate control and warfarin    2.  CAD  -Continue medical therapy, check lipids and follow-up    3.  Cardiomyopathy, suspect nonischemic  -Repeat echo next year    4.  Dilated ascending aorta  -Repeat echo in 1 year    5.  Hypertension, suspect mildly uncontrolled  -Increase lisinopril to 10 mg daily  -Encouraged purchasing a home monitor, goal blood pressure 130/80 or less, call the clinic if still running high    Follow-up in 1 year with echo.    Toño Michelle MD  Cardiology - [facility]  Pager:  [phone]  Text Page  July 14, 2021          Thank you for allowing me to participate in the care of your patient.      Sincerely,     Toño Michelle MD     [facility]  cc:   Toño Michelle MD  [facility]  [address] [place] [address]

## 2021-07-14 NOTE — PATIENT INSTRUCTIONS
Recommend purchasing a home blood pressure monitor.  You should check your blood pressure a few times per week at different times of the day.  Generally the arm cuff machines are better than the wrist cuff machines.  A good brand is [employer] (Series 5 or 7 is adequate, Series 3 is more basic, Series 10 has smartphone synching that you may not need).  Cost is $40 to $60.  Blood pressure goal is 130/80 or less.  You can purchase them at [employer], [employer], any pharmacy, or online such as [employer].      Increase lisinopril to 10mg daily.

## 2021-08-18 ENCOUNTER — LAB REQUISITION (OUTPATIENT)
Dept: LAB | Facility: CLINIC | Age: 66
End: 2021-08-18

## 2021-08-18 DIAGNOSIS — Z12.5 ENCOUNTER FOR SCREENING FOR MALIGNANT NEOPLASM OF PROSTATE: ICD-10-CM

## 2021-08-18 DIAGNOSIS — Z13.21 ENCOUNTER FOR SCREENING FOR NUTRITIONAL DISORDER: ICD-10-CM

## 2021-08-18 DIAGNOSIS — I10 ESSENTIAL (PRIMARY) HYPERTENSION: ICD-10-CM

## 2021-08-18 LAB
ANION GAP SERPL CALCULATED.3IONS-SCNC: 8 MMOL/L (ref 5–18)
BUN SERPL-MCNC: 12 MG/DL (ref 8–22)
CALCIUM SERPL-MCNC: 9.3 MG/DL (ref 8.5–10.5)
CHLORIDE BLD-SCNC: 104 MMOL/L (ref 98–107)
CHOLEST SERPL-MCNC: 157 MG/DL
CO2 SERPL-SCNC: 27 MMOL/L (ref 22–31)
CREAT SERPL-MCNC: 0.83 MG/DL (ref 0.7–1.3)
GFR SERPL CREATININE-BSD FRML MDRD: >90 ML/MIN/1.73M2
GLUCOSE BLD-MCNC: 105 MG/DL (ref 70–125)
HDLC SERPL-MCNC: 44 MG/DL
LDLC SERPL CALC-MCNC: 99 MG/DL
POTASSIUM BLD-SCNC: 3.6 MMOL/L (ref 3.5–5)
PSA SERPL-MCNC: 1.64 UG/L (ref 0–4.5)
SODIUM SERPL-SCNC: 139 MMOL/L (ref 136–145)
TRIGL SERPL-MCNC: 68 MG/DL

## 2021-08-18 PROCEDURE — 82306 VITAMIN D 25 HYDROXY: CPT | Performed by: PHYSICIAN ASSISTANT

## 2021-08-18 PROCEDURE — 80048 BASIC METABOLIC PNL TOTAL CA: CPT | Performed by: PHYSICIAN ASSISTANT

## 2021-08-18 PROCEDURE — G0103 PSA SCREENING: HCPCS | Performed by: PHYSICIAN ASSISTANT

## 2021-08-18 PROCEDURE — 80061 LIPID PANEL: CPT | Performed by: PHYSICIAN ASSISTANT

## 2021-08-19 LAB — DEPRECATED CALCIDIOL+CALCIFEROL SERPL-MC: 36 UG/L (ref 30–80)

## 2021-10-07 ENCOUNTER — DOCUMENTATION ONLY (OUTPATIENT)
Dept: CARDIOLOGY | Facility: CLINIC | Age: 66
End: 2021-10-07

## 2021-10-07 NOTE — PROGRESS NOTES
[place] [facility] MEASURES BLOOD PRESSURE RECHECK        Last office visit: 7/14/21     Previous blood pressure: 146/98 mm/Hg  Previous heart rate: 72 bpm     Time of visit:      Morning medications were taken at:      Home monitor blood pressure: 125/70 mmHg  Home monitor heart rate:  -- bpm        Additional Comments:         Results routed to:

## 2021-10-11 ENCOUNTER — HEALTH MAINTENANCE LETTER (OUTPATIENT)
Age: 66
End: 2021-10-11

## 2022-01-30 ENCOUNTER — HEALTH MAINTENANCE LETTER (OUTPATIENT)
Age: 67
End: 2022-01-30

## 2022-08-01 DIAGNOSIS — I10 BENIGN ESSENTIAL HYPERTENSION: ICD-10-CM

## 2022-08-01 RX ORDER — LISINOPRIL 10 MG/1
10 TABLET ORAL DAILY
Qty: 90 TABLET | Refills: 1 | Status: SHIPPED | OUTPATIENT
Start: 2022-08-01

## 2022-08-01 NOTE — LETTER
August 1, 2022       TO: Kings Priest  [address] [facility] [address]       Dear Kings Priest,    We recently received a call from your pharmacy requesting a refill of your medication(s).    Our records indicate that you are due for follow-up with your Heart Care Provider. We will refill your medications for 3 months which will allow you enough time to be seen.    Please call [phone] to schedule your appointment.    Thank you for allowing [facility] to be a part of your health care team and we look forward to seeing you soon.    Thank you,    [facility]

## 2022-08-01 NOTE — TELEPHONE ENCOUNTER
Received refill request for: Lisinopril  Last OV was: Dr. Michelle 21  Labs/EK21 BMP  F/U scheduled: No future appointments. Orders over due from 2022. Letter sent 22. Second letter sent today, orders , orders extended.  Pharmacy: [facility] Refill Guideline reviewed.  Medication meets criteria for refill.    Melody Hazel RN, BSN  22 at 12:05 PM

## 2022-09-24 ENCOUNTER — HEALTH MAINTENANCE LETTER (OUTPATIENT)
Age: 67
End: 2022-09-24

## 2023-05-08 ENCOUNTER — HEALTH MAINTENANCE LETTER (OUTPATIENT)
Age: 68
End: 2023-05-08

## 2024-07-14 ENCOUNTER — HEALTH MAINTENANCE LETTER (OUTPATIENT)
Age: 69
End: 2024-07-14